# Patient Record
Sex: MALE | Race: WHITE | NOT HISPANIC OR LATINO | Employment: FULL TIME | URBAN - METROPOLITAN AREA
[De-identification: names, ages, dates, MRNs, and addresses within clinical notes are randomized per-mention and may not be internally consistent; named-entity substitution may affect disease eponyms.]

---

## 2023-09-05 ENCOUNTER — HOSPITAL ENCOUNTER (OUTPATIENT)
Dept: DATA CONVERSION | Facility: HOSPITAL | Age: 28
Discharge: HOME | End: 2023-09-05
Payer: COMMERCIAL

## 2023-09-05 DIAGNOSIS — E06.3 AUTOIMMUNE THYROIDITIS: ICD-10-CM

## 2023-09-05 LAB
T4 FREE SERPL-MCNC: 1.1 NG/DL (ref 0.9–1.7)
TSH SERPL DL<=0.05 MIU/L-ACNC: 2.45 MIU/L (ref 0.27–4.2)

## 2024-02-09 PROBLEM — E03.9 HYPOTHYROIDISM: Status: ACTIVE | Noted: 2024-02-09

## 2024-02-09 PROBLEM — J01.90 ACUTE INFECTION OF NASAL SINUS: Status: RESOLVED | Noted: 2024-02-09 | Resolved: 2024-02-09

## 2024-02-09 PROBLEM — J30.2 SEASONAL ALLERGIC RHINITIS: Status: ACTIVE | Noted: 2024-02-09

## 2024-02-09 PROBLEM — K58.9 IRRITABLE BOWEL SYNDROME: Status: ACTIVE | Noted: 2024-02-09

## 2024-02-09 PROBLEM — K30 FUNCTIONAL DYSPEPSIA: Status: ACTIVE | Noted: 2020-03-05

## 2024-02-09 PROBLEM — H69.90 EUSTACHIAN TUBE DYSFUNCTION: Status: ACTIVE | Noted: 2024-02-09

## 2024-02-09 PROBLEM — J30.2 SEASONAL ALLERGIES: Status: ACTIVE | Noted: 2024-02-09

## 2024-02-09 RX ORDER — BENZOCAINE .13; .15; .5; 2 G/100G; G/100G; G/100G; G/100G
GEL ORAL
COMMUNITY
Start: 2015-10-20 | End: 2024-02-13 | Stop reason: WASHOUT

## 2024-02-09 RX ORDER — METOPROLOL SUCCINATE 25 MG/1
25 TABLET, EXTENDED RELEASE ORAL DAILY
COMMUNITY
End: 2024-02-14 | Stop reason: SDUPTHER

## 2024-02-09 RX ORDER — AMITRIPTYLINE HYDROCHLORIDE 10 MG/1
TABLET, FILM COATED ORAL EVERY 24 HOURS
COMMUNITY
Start: 2022-12-13 | End: 2024-02-14 | Stop reason: SDUPTHER

## 2024-02-09 RX ORDER — IBUPROFEN 200 MG
TABLET ORAL EVERY 8 HOURS
COMMUNITY
End: 2024-02-14 | Stop reason: ALTCHOICE

## 2024-02-09 RX ORDER — CETIRIZINE HYDROCHLORIDE 10 MG/1
TABLET ORAL
COMMUNITY
End: 2024-02-13 | Stop reason: WASHOUT

## 2024-02-12 ASSESSMENT — PROMIS GLOBAL HEALTH SCALE
CARRYOUT_PHYSICAL_ACTIVITIES: COMPLETELY
RATE_AVERAGE_FATIGUE: MILD
RATE_GENERAL_HEALTH: VERY GOOD
EMOTIONAL_PROBLEMS: RARELY
CARRYOUT_SOCIAL_ACTIVITIES: EXCELLENT
RATE_QUALITY_OF_LIFE: EXCELLENT
RATE_MENTAL_HEALTH: VERY GOOD
RATE_PHYSICAL_HEALTH: VERY GOOD
RATE_AVERAGE_PAIN: 0
RATE_SOCIAL_SATISFACTION: GOOD

## 2024-02-14 ENCOUNTER — OFFICE VISIT (OUTPATIENT)
Dept: PRIMARY CARE | Facility: CLINIC | Age: 29
End: 2024-02-14
Payer: COMMERCIAL

## 2024-02-14 ENCOUNTER — LAB (OUTPATIENT)
Dept: LAB | Facility: LAB | Age: 29
End: 2024-02-14
Payer: COMMERCIAL

## 2024-02-14 VITALS
TEMPERATURE: 97.7 F | BODY MASS INDEX: 25.77 KG/M2 | OXYGEN SATURATION: 99 % | SYSTOLIC BLOOD PRESSURE: 120 MMHG | WEIGHT: 172 LBS | DIASTOLIC BLOOD PRESSURE: 70 MMHG | HEART RATE: 74 BPM

## 2024-02-14 DIAGNOSIS — K30 FUNCTIONAL DYSPEPSIA: ICD-10-CM

## 2024-02-14 DIAGNOSIS — R73.9 HYPERGLYCEMIA: ICD-10-CM

## 2024-02-14 DIAGNOSIS — Z00.00 ANNUAL PHYSICAL EXAM: Primary | ICD-10-CM

## 2024-02-14 DIAGNOSIS — I49.3 PVC (PREMATURE VENTRICULAR CONTRACTION): ICD-10-CM

## 2024-02-14 DIAGNOSIS — E55.9 VITAMIN D DEFICIENCY: ICD-10-CM

## 2024-02-14 DIAGNOSIS — Z23 ENCOUNTER FOR IMMUNIZATION: ICD-10-CM

## 2024-02-14 DIAGNOSIS — E78.2 MIXED HYPERLIPIDEMIA: ICD-10-CM

## 2024-02-14 DIAGNOSIS — Z01.89 ENCOUNTER FOR ROUTINE LABORATORY TESTING: ICD-10-CM

## 2024-02-14 DIAGNOSIS — K58.9 IRRITABLE BOWEL SYNDROME, UNSPECIFIED TYPE: ICD-10-CM

## 2024-02-14 DIAGNOSIS — J31.0 CHRONIC RHINITIS: ICD-10-CM

## 2024-02-14 DIAGNOSIS — E03.8 SUBCLINICAL HYPOTHYROIDISM: ICD-10-CM

## 2024-02-14 PROBLEM — E03.9 HYPOTHYROIDISM: Status: RESOLVED | Noted: 2024-02-09 | Resolved: 2024-02-14

## 2024-02-14 PROBLEM — H69.90 EUSTACHIAN TUBE DYSFUNCTION: Status: RESOLVED | Noted: 2024-02-09 | Resolved: 2024-02-14

## 2024-02-14 LAB
25(OH)D3 SERPL-MCNC: 29 NG/ML (ref 31–100)
ALBUMIN SERPL-MCNC: 4.9 G/DL (ref 3.5–5)
ALP BLD-CCNC: 105 U/L (ref 35–125)
ALT SERPL-CCNC: 39 U/L (ref 5–40)
ANION GAP SERPL CALC-SCNC: 10 MMOL/L
AST SERPL-CCNC: 20 U/L (ref 5–40)
BASOPHILS # BLD AUTO: 0.07 X10*3/UL (ref 0–0.1)
BASOPHILS NFR BLD AUTO: 1.1 %
BILIRUB DIRECT SERPL-MCNC: <0.2 MG/DL (ref 0–0.2)
BILIRUB SERPL-MCNC: 0.6 MG/DL (ref 0.1–1.2)
BUN SERPL-MCNC: 21 MG/DL (ref 8–25)
CALCIUM SERPL-MCNC: 9.8 MG/DL (ref 8.5–10.4)
CHLORIDE SERPL-SCNC: 101 MMOL/L (ref 97–107)
CHOLEST SERPL-MCNC: 227 MG/DL (ref 133–200)
CHOLEST/HDLC SERPL: 4.5 {RATIO}
CO2 SERPL-SCNC: 28 MMOL/L (ref 24–31)
CREAT SERPL-MCNC: 1.2 MG/DL (ref 0.4–1.6)
EGFRCR SERPLBLD CKD-EPI 2021: 83 ML/MIN/1.73M*2
EOSINOPHIL # BLD AUTO: 0.31 X10*3/UL (ref 0–0.7)
EOSINOPHIL NFR BLD AUTO: 5.1 %
ERYTHROCYTE [DISTWIDTH] IN BLOOD BY AUTOMATED COUNT: 12.8 % (ref 11.5–14.5)
EST. AVERAGE GLUCOSE BLD GHB EST-MCNC: 105 MG/DL
GLUCOSE SERPL-MCNC: 95 MG/DL (ref 65–99)
HBA1C MFR BLD: 5.3 %
HCT VFR BLD AUTO: 48.3 % (ref 41–52)
HDLC SERPL-MCNC: 51 MG/DL
HGB BLD-MCNC: 15.8 G/DL (ref 13.5–17.5)
IMM GRANULOCYTES # BLD AUTO: 0.01 X10*3/UL (ref 0–0.7)
IMM GRANULOCYTES NFR BLD AUTO: 0.2 % (ref 0–0.9)
LDLC SERPL CALC-MCNC: 135 MG/DL (ref 65–130)
LYMPHOCYTES # BLD AUTO: 1.8 X10*3/UL (ref 1.2–4.8)
LYMPHOCYTES NFR BLD AUTO: 29.5 %
MAGNESIUM SERPL-MCNC: 2.4 MG/DL (ref 1.6–3.1)
MCH RBC QN AUTO: 30.4 PG (ref 26–34)
MCHC RBC AUTO-ENTMCNC: 32.7 G/DL (ref 32–36)
MCV RBC AUTO: 93 FL (ref 80–100)
MONOCYTES # BLD AUTO: 0.56 X10*3/UL (ref 0.1–1)
MONOCYTES NFR BLD AUTO: 9.2 %
NEUTROPHILS # BLD AUTO: 3.35 X10*3/UL (ref 1.2–7.7)
NEUTROPHILS NFR BLD AUTO: 54.9 %
NRBC BLD-RTO: 0 /100 WBCS (ref 0–0)
PHOSPHATE SERPL-MCNC: 3.8 MG/DL (ref 2.5–4.5)
PLATELET # BLD AUTO: 229 X10*3/UL (ref 150–450)
POTASSIUM SERPL-SCNC: 4.9 MMOL/L (ref 3.4–5.1)
PROT SERPL-MCNC: 7.5 G/DL (ref 5.9–7.9)
RBC # BLD AUTO: 5.19 X10*6/UL (ref 4.5–5.9)
SODIUM SERPL-SCNC: 139 MMOL/L (ref 133–145)
TRIGL SERPL-MCNC: 206 MG/DL (ref 40–150)
TSH SERPL DL<=0.05 MIU/L-ACNC: 2.89 MIU/L (ref 0.27–4.2)
WBC # BLD AUTO: 6.1 X10*3/UL (ref 4.4–11.3)

## 2024-02-14 PROCEDURE — 85025 COMPLETE CBC W/AUTO DIFF WBC: CPT

## 2024-02-14 PROCEDURE — 82306 VITAMIN D 25 HYDROXY: CPT

## 2024-02-14 PROCEDURE — 84443 ASSAY THYROID STIM HORMONE: CPT

## 2024-02-14 PROCEDURE — 82248 BILIRUBIN DIRECT: CPT

## 2024-02-14 PROCEDURE — 80061 LIPID PANEL: CPT

## 2024-02-14 PROCEDURE — 83735 ASSAY OF MAGNESIUM: CPT

## 2024-02-14 PROCEDURE — 80053 COMPREHEN METABOLIC PANEL: CPT

## 2024-02-14 PROCEDURE — 84100 ASSAY OF PHOSPHORUS: CPT

## 2024-02-14 PROCEDURE — 36415 COLL VENOUS BLD VENIPUNCTURE: CPT

## 2024-02-14 PROCEDURE — 99395 PREV VISIT EST AGE 18-39: CPT | Performed by: STUDENT IN AN ORGANIZED HEALTH CARE EDUCATION/TRAINING PROGRAM

## 2024-02-14 PROCEDURE — 83036 HEMOGLOBIN GLYCOSYLATED A1C: CPT

## 2024-02-14 RX ORDER — OXYMETAZOLINE HYDROCHLORIDE 0.05 G/100ML
2 SPRAY, METERED NASAL 2 TIMES DAILY PRN
Start: 2024-02-14

## 2024-02-14 RX ORDER — FLUTICASONE PROPIONATE 50 MCG
2 SPRAY, SUSPENSION (ML) NASAL DAILY PRN
Start: 2024-02-14 | End: 2024-02-14 | Stop reason: SDUPTHER

## 2024-02-14 RX ORDER — AMITRIPTYLINE HYDROCHLORIDE 10 MG/1
10 TABLET, FILM COATED ORAL NIGHTLY
Start: 2024-02-14 | End: 2024-03-13 | Stop reason: SDUPTHER

## 2024-02-14 RX ORDER — METOPROLOL SUCCINATE 25 MG/1
25 TABLET, EXTENDED RELEASE ORAL DAILY
Start: 2024-02-14

## 2024-02-14 RX ORDER — FLUTICASONE PROPIONATE 50 MCG
2 SPRAY, SUSPENSION (ML) NASAL 2 TIMES DAILY PRN
Start: 2024-02-14

## 2024-02-14 RX ORDER — SODIUM CHLORIDE 0.65 %
1 AEROSOL, SPRAY (ML) NASAL 2 TIMES DAILY PRN
Start: 2024-02-14

## 2024-02-14 RX ORDER — MULTIVIT-MIN/FOLIC/VIT K/LYCOP 400-300MCG
1 TABLET ORAL DAILY
Start: 2024-02-14

## 2024-02-14 ASSESSMENT — ENCOUNTER SYMPTOMS
ALLERGIC/IMMUNOLOGIC NEGATIVE: 1
GASTROINTESTINAL NEGATIVE: 1
HEMATOLOGIC/LYMPHATIC NEGATIVE: 1
EYES NEGATIVE: 1
LOSS OF SENSATION IN FEET: 0
PSYCHIATRIC NEGATIVE: 1
MUSCULOSKELETAL NEGATIVE: 1
OCCASIONAL FEELINGS OF UNSTEADINESS: 0
ENDOCRINE NEGATIVE: 1
CONSTITUTIONAL NEGATIVE: 1
NEUROLOGICAL NEGATIVE: 1
DEPRESSION: 0
RESPIRATORY NEGATIVE: 1
CARDIOVASCULAR NEGATIVE: 1

## 2024-02-14 ASSESSMENT — PATIENT HEALTH QUESTIONNAIRE - PHQ9
SUM OF ALL RESPONSES TO PHQ9 QUESTIONS 1 AND 2: 0
2. FEELING DOWN, DEPRESSED OR HOPELESS: NOT AT ALL
1. LITTLE INTEREST OR PLEASURE IN DOING THINGS: NOT AT ALL

## 2024-02-14 ASSESSMENT — PAIN SCALES - GENERAL: PAINLEVEL: 0-NO PAIN

## 2024-02-14 NOTE — PROGRESS NOTES
[facility]: [facility]  PHYSICAL EXAM      Mor Pham is a 28 y.o. male that is presenting today for Annual Exam.    Assessment/Plan   Diagnoses and all orders for this visit:  Annual physical exam  -     Follow Up In Primary Care; Future  Encounter for routine laboratory testing  -     CBC and Auto Differential; Future  -     Basic Metabolic Panel; Future  -     Hepatic Function Panel; Future  -     Lipid Panel; Future  -     Hemoglobin A1C; Future  -     Vitamin D 25-Hydroxy,Total (for eval of Vitamin D levels); Future  -     TSH with reflex to Free T4 if abnormal; Future  -     Magnesium; Future  -     Phosphorus; Future  -     Basic Metabolic Panel; Future  -     Vitamin D 25-Hydroxy,Total (for eval of Vitamin D levels); Future  -     TSH with reflex to Free T4 if abnormal; Future  -     Magnesium; Future  -     Phosphorus; Future  Mixed hyperlipidemia  -     Lipid Panel; Future  Vitamin D deficiency  -     Vitamin D 25-Hydroxy,Total (for eval of Vitamin D levels); Future  -     Vitamin D 25-Hydroxy,Total (for eval of Vitamin D levels); Future  Hyperglycemia  -     Hemoglobin A1C; Future  Encounter for immunization  Irritable bowel syndrome, unspecified type  -     amitriptyline (Elavil) 10 mg tablet; Take 1 tablet (10 mg) by mouth once daily at bedtime.  PVC (premature ventricular contraction)  -     metoprolol succinate XL (Toprol-XL) 25 mg 24 hr tablet; Take 1 tablet (25 mg) by mouth once daily.  -     multivit-min-folic-vit K-lycop (One-A-Day Men's Multivitamin) 400- mcg tablet; Take 1 tablet by mouth once daily.  -     Basic Metabolic Panel; Future  -     Magnesium; Future  -     Phosphorus; Future  -     Magnesium; Future  -     Phosphorus; Future  Subclinical hypothyroidism  -     TSH with reflex to Free T4 if abnormal; Future  -     TSH with reflex to Free T4 if abnormal; Future  Functional dyspepsia  -     amitriptyline (Elavil) 10 mg tablet; Take 1 tablet (10 mg) by  mouth once daily at bedtime.  Chronic rhinitis  -     sodium chloride (Ocean Nasal) 0.65 % nasal spray; Administer 1 spray into each nostril 2 times a day as needed for congestion.  -     oxymetazoline (Afrin, oxymetazoline,) 0.05 % nasal spray; Administer 2 sprays into each nostril 2 times a day as needed for congestion. Do not use for more than 3 days.  -     fluticasone (Flonase) 50 mcg/actuation nasal spray; Administer 2 sprays into each nostril 2 times a day as needed for rhinitis or allergies. Shake gently. Before first use, prime pump. After use, clean tip and replace cap.    - Significant medication and problem list reconciliation done today.  - Patient notes that, since he has started exercising over the summer, he has been dealing with intermittent red rashes in his armpit. Suspect that this is localized fungal infection (similar to athlete's foot). Counseled OTC fungal medications as needed.  - Patient noting that his seasonal allergies / chronic rhinitis is still bothering him this time of the year. Counseled OTC sprays since his major concern is maximally / frontal sinus congestion. Counseled the patient that he cannot use the afrin more than three days in a row without taking a day off. Patient voiced understanding.  - Patient due for labwork. Ordered today. Will also order labwork for the patient's next appointment.  - Patient does not appear to be due for routine immunizations at this time.    Subjective   - The patient otherwise feels well and denies any acute symptoms or concerns at this time.  - The patient denies any changes or progression of their chronic medical problems.  - The patient denies any problems or concerns with their medications.      Review of Systems   Constitutional: Negative.    HENT: Negative.     Eyes: Negative.    Respiratory: Negative.     Cardiovascular: Negative.    Gastrointestinal: Negative.    Endocrine: Negative.    Genitourinary: Negative.    Musculoskeletal:  Negative.    Skin: Negative.    Allergic/Immunologic: Negative.    Neurological: Negative.    Hematological: Negative.    Psychiatric/Behavioral: Negative.     All other systems reviewed and are negative.     Objective   Vitals:    02/14/24 0806   BP: 120/70   Pulse: 74   Temp: 36.5 °C (97.7 °F)   SpO2: 99%     Body mass index is 25.77 kg/m².  Physical Exam  Vitals and nursing note reviewed.   Constitutional:       General: He is not in acute distress.     Appearance: Normal appearance. He is not ill-appearing.   HENT:      Head: Normocephalic and atraumatic.      Right Ear: Tympanic membrane, ear canal and external ear normal. There is no impacted cerumen.      Left Ear: Tympanic membrane, ear canal and external ear normal. There is no impacted cerumen.      Nose: Nose normal.      Mouth/Throat:      Mouth: Mucous membranes are moist.      Pharynx: Oropharynx is clear. No oropharyngeal exudate or posterior oropharyngeal erythema.   Eyes:      General: No scleral icterus.        Right eye: No discharge.         Left eye: No discharge.      Extraocular Movements: Extraocular movements intact.      Conjunctiva/sclera: Conjunctivae normal.      Pupils: Pupils are equal, round, and reactive to light.   Neck:      Vascular: No carotid bruit.   Cardiovascular:      Rate and Rhythm: Normal rate and regular rhythm.      Pulses: Normal pulses.      Heart sounds: Normal heart sounds. No murmur heard.     No friction rub. No gallop.   Pulmonary:      Effort: Pulmonary effort is normal. No respiratory distress.      Breath sounds: Normal breath sounds.   Abdominal:      General: Abdomen is flat. Bowel sounds are normal.      Palpations: Abdomen is soft.      Tenderness: There is no abdominal tenderness.      Hernia: No hernia is present.   Musculoskeletal:         General: No swelling. Normal range of motion.      Cervical back: Normal range of motion.   Lymphadenopathy:      Cervical: No cervical adenopathy.   Skin:      "General: Skin is warm and dry.      Coloration: Skin is not jaundiced.      Findings: No rash.   Neurological:      General: No focal deficit present.      Mental Status: He is alert and oriented to person, place, and time. Mental status is at baseline.   Psychiatric:         Mood and Affect: Mood normal.         Behavior: Behavior normal.       Diagnostic Results   Lab Results   Component Value Date    GLUCOSE 101 (H) 12/27/2022    CALCIUM 9.5 12/27/2022     12/27/2022    K 4.7 12/27/2022    CO2 29 12/27/2022     12/27/2022    BUN 17 12/27/2022    CREATININE 1.0 12/27/2022     Lab Results   Component Value Date    ALT 25 03/30/2023    AST 20 03/30/2023    ALKPHOS 78 03/30/2023    BILITOT 0.7 03/30/2023     Lab Results   Component Value Date    WBC 5.5 12/27/2022    HGB 16.3 12/27/2022    HCT 48.1 12/27/2022    MCV 93.6 12/27/2022     12/27/2022     No results found for: \"CHOL\"  No results found for: \"HDL\"  No results found for: \"LDLCALC\"  No results found for: \"TRIG\"  No components found for: \"CHOLHDL\"  Lab Results   Component Value Date    HGBA1C 5.0 12/27/2022     Other labs not included in the list above were reviewed either before or during this encounter.    History   Past Medical History:   Diagnosis Date    Acute infection of nasal sinus 02/09/2024    Allergic     Heart murmur      Past Surgical History:   Procedure Laterality Date    CIRCUMCISION, PRIMARY      HERNIA REPAIR  07/31/2013    Hernia Repair     No family history on file.  Social History     Socioeconomic History    Marital status: Single     Spouse name: Not on file    Number of children: Not on file    Years of education: Not on file    Highest education level: Not on file   Occupational History    Not on file   Tobacco Use    Smoking status: Never    Smokeless tobacco: Never   Vaping Use    Vaping Use: Never used   Substance and Sexual Activity    Alcohol use: Yes     Alcohol/week: 1.0 standard drink of alcohol     Types: 1 " Standard drinks or equivalent per week    Drug use: Yes     Types: Marijuana     Comment: Very occasionally (once every couple months)    Sexual activity: Not Currently     Partners: Female   Other Topics Concern    Not on file   Social History Narrative    Not on file     Social Determinants of Health     Financial Resource Strain: Not on file   Food Insecurity: Not on file   Transportation Needs: Not on file   Physical Activity: Not on file   Stress: Not on file   Social Connections: Not on file   Intimate Partner Violence: Not on file   Housing Stability: Not on file     No Known Allergies  Current Outpatient Medications on File Prior to Visit   Medication Sig Dispense Refill    [DISCONTINUED] amitriptyline (Elavil) 10 mg tablet once every 24 hours.      [DISCONTINUED] fluticasone propionate (FLONASE NASL) Flonase      [DISCONTINUED] ibuprofen 200 mg tablet every 8 hours.      [DISCONTINUED] metoprolol succinate XL (Toprol-XL) 25 mg 24 hr tablet Take 1 tablet (25 mg) by mouth once daily.      [DISCONTINUED] multivit-min/folic/vit K/lycop (ONE-A-DAY MEN'S MULTIVITAMIN ORAL) once every 24 hours.      [DISCONTINUED] budesonide (Rhinocort AQ) 32 mcg/actuation nasal spray Administer into affected nostril(s).      [DISCONTINUED] cetirizine (ZyrTEC) 10 mg tablet Take by mouth.       No current facility-administered medications on file prior to visit.     Immunization History   Administered Date(s) Administered    DTP 1993, 1993, 1993, 10/07/1994, 06/28/1998    DTP / HiB 1993, 1993, 1993    Flu vaccine (IIV4), preservative free *Check age/dose* 11/07/2021    Flu vaccine, quadrivalent, no egg protein, age 6 month or greater (FLUCELVAX) 10/04/2022, 09/25/2023    Hep A, Unspecified 08/25/2010, 08/23/2011    Hepatitis A vaccine, pediatric/adolescent (HAVRIX, VAQTA) 08/25/2010, 08/23/2011    Hepatitis B vaccine, adult (RECOMBIVAX, ENGERIX) 1993, 1993, 12/10/2003    Hepatitis B  vaccine, pediatric/adolescent (RECOMBIVAX, ENGERIX) 1993, 1993, 1993    HiB PRP-OMP conjugate vaccine, pediatric (PEDVAXHIB) 06/07/1994    HiB, unspecified 1993, 1993, 1993, 06/07/1994    MMR vaccine, subcutaneous (MMR II) 06/07/1994, 06/28/1998    Meningococcal MCV4P 09/28/2007, 07/31/2013    Pfizer Purple Cap SARS-CoV-2 04/09/2021, 12/08/2021    Polio, Unspecified 1993, 1993, 10/07/1994, 06/28/1998    Poliovirus vaccine, subcutaneous (IPOL) 1993, 1993, 10/07/1994, 06/28/1998    Td (adult), unspecified 08/10/2004    Td vaccine, age 7 years and older (TDVAX) 08/10/2004    Tdap vaccine, age 7 year and older (BOOSTRIX, ADACEL) 08/05/2009, 12/27/2022     Patient's medical history was reviewed and updated either before or during this encounter.       Pavel Contreras MD

## 2024-02-14 NOTE — PATIENT INSTRUCTIONS
Diagnoses and all orders for this visit:  Annual physical exam  -     Follow Up In Primary Care; Future  Encounter for routine laboratory testing  -     CBC and Auto Differential; Future  -     Basic Metabolic Panel; Future  -     Hepatic Function Panel; Future  -     Lipid Panel; Future  -     Hemoglobin A1C; Future  -     Vitamin D 25-Hydroxy,Total (for eval of Vitamin D levels); Future  -     TSH with reflex to Free T4 if abnormal; Future  -     Magnesium; Future  -     Phosphorus; Future  -     Basic Metabolic Panel; Future  -     Vitamin D 25-Hydroxy,Total (for eval of Vitamin D levels); Future  -     TSH with reflex to Free T4 if abnormal; Future  -     Magnesium; Future  -     Phosphorus; Future  Mixed hyperlipidemia  -     Lipid Panel; Future  Vitamin D deficiency  -     Vitamin D 25-Hydroxy,Total (for eval of Vitamin D levels); Future  -     Vitamin D 25-Hydroxy,Total (for eval of Vitamin D levels); Future  Hyperglycemia  -     Hemoglobin A1C; Future  Encounter for immunization  Irritable bowel syndrome, unspecified type  -     amitriptyline (Elavil) 10 mg tablet; Take 1 tablet (10 mg) by mouth once daily at bedtime.  PVC (premature ventricular contraction)  -     metoprolol succinate XL (Toprol-XL) 25 mg 24 hr tablet; Take 1 tablet (25 mg) by mouth once daily.  -     multivit-min-folic-vit K-lycop (One-A-Day Men's Multivitamin) 400- mcg tablet; Take 1 tablet by mouth once daily.  -     Basic Metabolic Panel; Future  -     Magnesium; Future  -     Phosphorus; Future  -     Magnesium; Future  -     Phosphorus; Future  Subclinical hypothyroidism  -     TSH with reflex to Free T4 if abnormal; Future  -     TSH with reflex to Free T4 if abnormal; Future  Functional dyspepsia  -     amitriptyline (Elavil) 10 mg tablet; Take 1 tablet (10 mg) by mouth once daily at bedtime.  Chronic rhinitis  -     sodium chloride (Ocean Nasal) 0.65 % nasal spray; Administer 1 spray into each nostril 2 times a day as needed  for congestion.  -     oxymetazoline (Afrin, oxymetazoline,) 0.05 % nasal spray; Administer 2 sprays into each nostril 2 times a day as needed for congestion. Do not use for more than 3 days.  -     fluticasone (Flonase) 50 mcg/actuation nasal spray; Administer 2 sprays into each nostril 2 times a day as needed for rhinitis or allergies. Shake gently. Before first use, prime pump. After use, clean tip and replace cap.    - Significant medication and problem list reconciliation done today.  - Patient notes that, since he has started exercising over the summer, he has been dealing with intermittent red rashes in his armpit. Suspect that this is localized fungal infection (similar to athlete's foot). Counseled OTC fungal medications as needed.  - Patient noting that his seasonal allergies / chronic rhinitis is still bothering him this time of the year. Counseled OTC sprays since his major concern is maximally / frontal sinus congestion. Counseled the patient that he cannot use the afrin more than three days in a row without taking a day off. Patient voiced understanding.  - Patient due for labwork. Ordered today. Will also order labwork for the patient's next appointment.  - Patient does not appear to be due for routine immunizations at this time.

## 2024-03-12 ENCOUNTER — PATIENT MESSAGE (OUTPATIENT)
Dept: PRIMARY CARE | Facility: CLINIC | Age: 29
End: 2024-03-12
Payer: COMMERCIAL

## 2024-03-12 DIAGNOSIS — K30 FUNCTIONAL DYSPEPSIA: ICD-10-CM

## 2024-03-12 DIAGNOSIS — K58.9 IRRITABLE BOWEL SYNDROME, UNSPECIFIED TYPE: ICD-10-CM

## 2024-03-13 RX ORDER — AMITRIPTYLINE HYDROCHLORIDE 10 MG/1
10 TABLET, FILM COATED ORAL NIGHTLY
Qty: 30 TABLET | Refills: 3 | Status: SHIPPED | OUTPATIENT
Start: 2024-03-13

## 2024-11-13 ENCOUNTER — PATIENT MESSAGE (OUTPATIENT)
Dept: PRIMARY CARE | Facility: CLINIC | Age: 29
End: 2024-11-13
Payer: COMMERCIAL

## 2024-11-13 DIAGNOSIS — I49.3 PVC (PREMATURE VENTRICULAR CONTRACTION): ICD-10-CM

## 2024-11-13 RX ORDER — METOPROLOL SUCCINATE 25 MG/1
25 TABLET, EXTENDED RELEASE ORAL DAILY
Qty: 90 TABLET | Refills: 3 | Status: SHIPPED | OUTPATIENT
Start: 2024-11-13

## 2024-12-20 ENCOUNTER — LAB (OUTPATIENT)
Dept: LAB | Facility: LAB | Age: 29
End: 2024-12-20
Payer: COMMERCIAL

## 2024-12-20 DIAGNOSIS — L65.9 NONSCARRING HAIR LOSS, UNSPECIFIED: Primary | ICD-10-CM

## 2024-12-20 DIAGNOSIS — E34.9 ENDOCRINE DISORDER, UNSPECIFIED: ICD-10-CM

## 2024-12-20 DIAGNOSIS — L63.8 OTHER ALOPECIA AREATA: ICD-10-CM

## 2024-12-20 LAB
25(OH)D3 SERPL-MCNC: 31 NG/ML (ref 30–100)
ANION GAP SERPL CALCULATED.3IONS-SCNC: 10 MMOL/L (ref 10–20)
BASOPHILS # BLD AUTO: 0.08 X10*3/UL (ref 0–0.1)
BASOPHILS NFR BLD AUTO: 1.2 %
BUN SERPL-MCNC: 21 MG/DL (ref 6–23)
CALCIUM SERPL-MCNC: 9.9 MG/DL (ref 8.6–10.3)
CHLORIDE SERPL-SCNC: 101 MMOL/L (ref 98–107)
CO2 SERPL-SCNC: 30 MMOL/L (ref 21–32)
CREAT SERPL-MCNC: 1.18 MG/DL (ref 0.5–1.3)
EGFRCR SERPLBLD CKD-EPI 2021: 85 ML/MIN/1.73M*2
EOSINOPHIL # BLD AUTO: 0.28 X10*3/UL (ref 0–0.7)
EOSINOPHIL NFR BLD AUTO: 4.3 %
ERYTHROCYTE [DISTWIDTH] IN BLOOD BY AUTOMATED COUNT: 13.1 % (ref 11.5–14.5)
FERRITIN SERPL-MCNC: 133 NG/ML (ref 20–300)
FOLATE SERPL-MCNC: 16.4 NG/ML
GLUCOSE SERPL-MCNC: 85 MG/DL (ref 74–99)
HCT VFR BLD AUTO: 44.7 % (ref 41–52)
HGB BLD-MCNC: 15.1 G/DL (ref 13.5–17.5)
IMM GRANULOCYTES # BLD AUTO: 0.01 X10*3/UL (ref 0–0.7)
IMM GRANULOCYTES NFR BLD AUTO: 0.2 % (ref 0–0.9)
LYMPHOCYTES # BLD AUTO: 2.02 X10*3/UL (ref 1.2–4.8)
LYMPHOCYTES NFR BLD AUTO: 31.4 %
MCH RBC QN AUTO: 30.4 PG (ref 26–34)
MCHC RBC AUTO-ENTMCNC: 33.8 G/DL (ref 32–36)
MCV RBC AUTO: 90 FL (ref 80–100)
MONOCYTES # BLD AUTO: 0.64 X10*3/UL (ref 0.1–1)
MONOCYTES NFR BLD AUTO: 9.9 %
NEUTROPHILS # BLD AUTO: 3.41 X10*3/UL (ref 1.2–7.7)
NEUTROPHILS NFR BLD AUTO: 53 %
NRBC BLD-RTO: 0 /100 WBCS (ref 0–0)
PLATELET # BLD AUTO: 236 X10*3/UL (ref 150–450)
POTASSIUM SERPL-SCNC: 4.4 MMOL/L (ref 3.5–5.3)
RBC # BLD AUTO: 4.96 X10*6/UL (ref 4.5–5.9)
SODIUM SERPL-SCNC: 137 MMOL/L (ref 136–145)
TSH SERPL-ACNC: 2.51 MIU/L (ref 0.44–3.98)
VIT B12 SERPL-MCNC: 622 PG/ML (ref 211–911)
WBC # BLD AUTO: 6.4 X10*3/UL (ref 4.4–11.3)

## 2024-12-20 PROCEDURE — 82728 ASSAY OF FERRITIN: CPT

## 2024-12-20 PROCEDURE — 84443 ASSAY THYROID STIM HORMONE: CPT

## 2024-12-20 PROCEDURE — 80048 BASIC METABOLIC PNL TOTAL CA: CPT

## 2024-12-20 PROCEDURE — 86038 ANTINUCLEAR ANTIBODIES: CPT

## 2024-12-20 PROCEDURE — 82746 ASSAY OF FOLIC ACID SERUM: CPT

## 2024-12-20 PROCEDURE — 85025 COMPLETE CBC W/AUTO DIFF WBC: CPT

## 2024-12-20 PROCEDURE — 82607 VITAMIN B-12: CPT

## 2024-12-20 PROCEDURE — 82306 VITAMIN D 25 HYDROXY: CPT

## 2024-12-22 LAB — ZINC SERPL-MCNC: 77 UG/DL (ref 60–120)

## 2024-12-23 LAB — ANA SER QL HEP2 SUBST: NEGATIVE

## 2025-01-12 DIAGNOSIS — K30 FUNCTIONAL DYSPEPSIA: ICD-10-CM

## 2025-01-12 DIAGNOSIS — K58.9 IRRITABLE BOWEL SYNDROME, UNSPECIFIED TYPE: ICD-10-CM

## 2025-01-13 RX ORDER — AMITRIPTYLINE HYDROCHLORIDE 10 MG/1
10 TABLET, FILM COATED ORAL NIGHTLY
Qty: 30 TABLET | Refills: 0 | Status: SHIPPED | OUTPATIENT
Start: 2025-01-13

## 2025-02-16 DIAGNOSIS — K30 FUNCTIONAL DYSPEPSIA: ICD-10-CM

## 2025-02-16 DIAGNOSIS — K58.9 IRRITABLE BOWEL SYNDROME, UNSPECIFIED TYPE: ICD-10-CM

## 2025-02-17 ENCOUNTER — OFFICE VISIT (OUTPATIENT)
Dept: PRIMARY CARE | Facility: CLINIC | Age: 30
End: 2025-02-17
Payer: COMMERCIAL

## 2025-02-17 VITALS
HEART RATE: 74 BPM | OXYGEN SATURATION: 99 % | WEIGHT: 178 LBS | DIASTOLIC BLOOD PRESSURE: 70 MMHG | BODY MASS INDEX: 26.36 KG/M2 | SYSTOLIC BLOOD PRESSURE: 128 MMHG | HEIGHT: 69 IN

## 2025-02-17 DIAGNOSIS — K30 FUNCTIONAL DYSPEPSIA: ICD-10-CM

## 2025-02-17 DIAGNOSIS — E78.2 MIXED HYPERLIPIDEMIA: ICD-10-CM

## 2025-02-17 DIAGNOSIS — R73.9 HYPERGLYCEMIA: ICD-10-CM

## 2025-02-17 DIAGNOSIS — Z23 ENCOUNTER FOR IMMUNIZATION: ICD-10-CM

## 2025-02-17 DIAGNOSIS — E55.9 VITAMIN D DEFICIENCY: ICD-10-CM

## 2025-02-17 DIAGNOSIS — L64.9 MALE PATTERN BALDNESS: ICD-10-CM

## 2025-02-17 DIAGNOSIS — J31.0 CHRONIC RHINITIS: ICD-10-CM

## 2025-02-17 DIAGNOSIS — I49.3 PVC (PREMATURE VENTRICULAR CONTRACTION): ICD-10-CM

## 2025-02-17 DIAGNOSIS — R53.82 CHRONIC FATIGUE: ICD-10-CM

## 2025-02-17 DIAGNOSIS — K58.9 IRRITABLE BOWEL SYNDROME, UNSPECIFIED TYPE: ICD-10-CM

## 2025-02-17 DIAGNOSIS — E03.8 SUBCLINICAL HYPOTHYROIDISM: ICD-10-CM

## 2025-02-17 DIAGNOSIS — Z01.89 ENCOUNTER FOR ROUTINE LABORATORY TESTING: ICD-10-CM

## 2025-02-17 DIAGNOSIS — Z00.00 ANNUAL PHYSICAL EXAM: Primary | ICD-10-CM

## 2025-02-17 LAB
CHOLEST SERPL-MCNC: 216 MG/DL
CHOLEST/HDLC SERPL: 4.6 (CALC)
HDLC SERPL-MCNC: 47 MG/DL
LDLC SERPL CALC-MCNC: 130 MG/DL (CALC)
NONHDLC SERPL-MCNC: 169 MG/DL (CALC)
TRIGL SERPL-MCNC: 240 MG/DL

## 2025-02-17 PROCEDURE — 99395 PREV VISIT EST AGE 18-39: CPT | Performed by: STUDENT IN AN ORGANIZED HEALTH CARE EDUCATION/TRAINING PROGRAM

## 2025-02-17 PROCEDURE — 1036F TOBACCO NON-USER: CPT | Performed by: STUDENT IN AN ORGANIZED HEALTH CARE EDUCATION/TRAINING PROGRAM

## 2025-02-17 PROCEDURE — 3008F BODY MASS INDEX DOCD: CPT | Performed by: STUDENT IN AN ORGANIZED HEALTH CARE EDUCATION/TRAINING PROGRAM

## 2025-02-17 RX ORDER — MINOXIDIL 2.5 MG/1
2.5 TABLET ORAL DAILY
COMMUNITY
Start: 2025-02-08 | End: 2025-02-17 | Stop reason: DRUGHIGH

## 2025-02-17 RX ORDER — AMITRIPTYLINE HYDROCHLORIDE 10 MG/1
TABLET, FILM COATED ORAL
Qty: 30 TABLET | Refills: 0 | OUTPATIENT
Start: 2025-02-17

## 2025-02-17 RX ORDER — METOPROLOL SUCCINATE 25 MG/1
25 TABLET, EXTENDED RELEASE ORAL DAILY
Status: SHIPPED
Start: 2025-02-17

## 2025-02-17 RX ORDER — MINOXIDIL 2.5 MG/1
TABLET ORAL
Status: SHIPPED
Start: 2025-02-17

## 2025-02-17 RX ORDER — AMITRIPTYLINE HYDROCHLORIDE 10 MG/1
10 TABLET, FILM COATED ORAL NIGHTLY
Qty: 90 TABLET | Refills: 3 | Status: SHIPPED | OUTPATIENT
Start: 2025-02-17 | End: 2026-02-17

## 2025-02-17 ASSESSMENT — PATIENT HEALTH QUESTIONNAIRE - PHQ9
SUM OF ALL RESPONSES TO PHQ9 QUESTIONS 1 AND 2: 0
1. LITTLE INTEREST OR PLEASURE IN DOING THINGS: NOT AT ALL
2. FEELING DOWN, DEPRESSED OR HOPELESS: NOT AT ALL

## 2025-02-17 ASSESSMENT — ENCOUNTER SYMPTOMS
ALLERGIC/IMMUNOLOGIC NEGATIVE: 1
CARDIOVASCULAR NEGATIVE: 1
HEMATOLOGIC/LYMPHATIC NEGATIVE: 1
CONSTITUTIONAL NEGATIVE: 1
PSYCHIATRIC NEGATIVE: 1
NEUROLOGICAL NEGATIVE: 1
MUSCULOSKELETAL NEGATIVE: 1
ENDOCRINE NEGATIVE: 1
GASTROINTESTINAL NEGATIVE: 1
RESPIRATORY NEGATIVE: 1
EYES NEGATIVE: 1

## 2025-02-17 ASSESSMENT — PAIN SCALES - GENERAL: PAINLEVEL_OUTOF10: 0-NO PAIN

## 2025-02-17 NOTE — PROGRESS NOTES
[facility]: [facility]  PHYSICAL EXAM      Mor Pham is a 29 y.o. male that is presenting today for Annual Exam.    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 States that he had to stop working at that time and took approximately 20 minutes before he felt like he was back to his baseline. Patient states that, outside of this episode, he really hasn't had any palpitations in recent history. For now, will continue current dosing of the metoprolol. Patient needs a new cardiologist.  - Patient notes that he has been dealing with some R-index finger strain and occasionally gets some pain in the joint.  - Otherwise, the patient feels well and denies any acute symptoms / concerns at this time.  - Blood pressure at goal today.  - Encouraged continued dietary, exercise, and lifestyle modification.  - Significant medication and problem list reconciliation done today.     Discussed routine and/or preventative care with the patient as outlined below:  - Labwork:   - Patient had labwork done for this appointment. Discussed today.   - It looks like the patient had some labwork done prior to starting the minoxidil. That being said, the patient did not have his cholesterol done. Discussed whether or not we need to send him back to the lab this year or just do it next year.  - Will order labwork for the patient's next appointment. Encouraged the patient to get this labwork done one week prior to the next appointment.  - Imaging:   - Patient does not appear to be due for routine imaging at this time.  - Immunizations:   - Patient does not appear to be due for routine immunizations at this time.     Diagnoses and all orders for this visit:  Annual physical exam  -     Follow Up In Primary Care  Encounter for routine laboratory testing  Mixed hyperlipidemia  -     Lipid Panel; Future  -     Hepatic Function Panel;  Future  -     Lipid Panel; Future  Vitamin D deficiency  -     Vitamin D 25-Hydroxy,Total (for eval of Vitamin D levels); Future  Chronic fatigue  -     CBC and Auto Differential; Future  -     Basic Metabolic Panel; Future  -     Testosterone,Free and Total; Future  Hyperglycemia  -     Hemoglobin A1C; Future  Encounter for immunization  Irritable bowel syndrome, unspecified type  -     amitriptyline (Elavil) 10 mg tablet; Take 1 tablet (10 mg) by mouth once daily at bedtime.  PVC (premature ventricular contraction)  -     metoprolol succinate XL (Toprol-XL) 25 mg 24 hr tablet; Take 1 tablet (25 mg) by mouth once daily.  -     Referral to Cardiology; Future  Subclinical hypothyroidism  -     TSH with reflex to Free T4 if abnormal; Future  Male pattern baldness  -     Testosterone,Free and Total; Future  Functional dyspepsia  -     amitriptyline (Elavil) 10 mg tablet; Take 1 tablet (10 mg) by mouth once daily at bedtime.  Chronic rhinitis  Other orders  -     minoxidil (Loniten) 2.5 mg tablet; Take 0.25 tablet once/day.    Current Outpatient Medications   Medication Instructions    amitriptyline (ELAVIL) 10 mg, oral, Nightly    fluticasone (Flonase) 50 mcg/actuation nasal spray 2 sprays, Each Nostril, 2 times daily PRN, Shake gently. Before first use, prime pump. After use, clean tip and replace cap.    metoprolol succinate XL (TOPROL-XL) 25 mg, oral, Daily    minoxidil (Loniten) 2.5 mg tablet Take 0.25 tablet once/day.    multivit-min-folic-vit K-lycop (One-A-Day Men's Multivitamin) 400- mcg tablet 1 tablet, oral, Daily    oxymetazoline (Afrin, oxymetazoline,) 0.05 % nasal spray 2 sprays, Each Nostril, 2 times daily PRN, Do not use for more than 3 days.    sodium chloride (Ocean Nasal) 0.65 % nasal spray 1 spray, Each Nostril, 2 times daily PRN     Subjective   - The patient otherwise feels well and denies any acute symptoms or concerns at this time.  - The patient denies any changes or progression of their  chronic medical problems.  - The patient denies any problems or concerns with their medications.      Review of Systems   Constitutional: Negative.    HENT: Negative.     Eyes: Negative.    Respiratory: Negative.     Cardiovascular: Negative.    Gastrointestinal: Negative.    Endocrine: Negative.    Genitourinary: Negative.    Musculoskeletal: Negative.    Skin: Negative.    Allergic/Immunologic: Negative.    Neurological: Negative.    Hematological: Negative.    Psychiatric/Behavioral: Negative.     All other systems reviewed and are negative.     Objective   Vitals:    02/17/25 0806   BP: 128/70   Pulse: 74   SpO2: 99%     Body mass index is 26.67 kg/m².  Physical Exam  Vitals and nursing note reviewed.   Constitutional:       General: He is not in acute distress.     Appearance: Normal appearance. He is not ill-appearing.   HENT:      Head: Normocephalic and atraumatic.      Right Ear: Tympanic membrane, ear canal and external ear normal. There is no impacted cerumen.      Left Ear: Tympanic membrane, ear canal and external ear normal. There is no impacted cerumen.      Nose: Nose normal.      Mouth/Throat:      Mouth: Mucous membranes are moist.      Pharynx: Oropharynx is clear. No oropharyngeal exudate or posterior oropharyngeal erythema.   Eyes:      General: No scleral icterus.        Right eye: No discharge.         Left eye: No discharge.      Extraocular Movements: Extraocular movements intact.      Conjunctiva/sclera: Conjunctivae normal.      Pupils: Pupils are equal, round, and reactive to light.   Neck:      Vascular: No carotid bruit.   Cardiovascular:      Rate and Rhythm: Normal rate and regular rhythm.      Pulses: Normal pulses.      Heart sounds: Normal heart sounds. No murmur heard.     No friction rub. No gallop.   Pulmonary:      Effort: Pulmonary effort is normal. No respiratory distress.      Breath sounds: Normal breath sounds.   Abdominal:      General: Abdomen is flat. Bowel sounds are  "normal.      Palpations: Abdomen is soft.      Tenderness: There is no abdominal tenderness.      Hernia: No hernia is present.   Musculoskeletal:         General: No swelling. Normal range of motion.      Cervical back: Normal range of motion.   Lymphadenopathy:      Cervical: No cervical adenopathy.   Skin:     General: Skin is warm and dry.      Coloration: Skin is not jaundiced.      Findings: No rash.   Neurological:      General: No focal deficit present.      Mental Status: He is alert and oriented to person, place, and time. Mental status is at baseline.   Psychiatric:         Mood and Affect: Mood normal.         Behavior: Behavior normal.       Diagnostic Results   Lab Results   Component Value Date    GLUCOSE 85 12/20/2024    CALCIUM 9.9 12/20/2024     12/20/2024    K 4.4 12/20/2024    CO2 30 12/20/2024     12/20/2024    BUN 21 12/20/2024    CREATININE 1.18 12/20/2024     Lab Results   Component Value Date    ALT 39 02/14/2024    AST 20 02/14/2024    ALKPHOS 105 02/14/2024    BILITOT 0.6 02/14/2024     Lab Results   Component Value Date    WBC 6.4 12/20/2024    HGB 15.1 12/20/2024    HCT 44.7 12/20/2024    MCV 90 12/20/2024     12/20/2024     Lab Results   Component Value Date    CHOL 227 (H) 02/14/2024     Lab Results   Component Value Date    HDL 51.0 02/14/2024     Lab Results   Component Value Date    LDLCALC 135 (H) 02/14/2024     Lab Results   Component Value Date    TRIG 206 (H) 02/14/2024     No components found for: \"CHOLHDL\"  Lab Results   Component Value Date    HGBA1C 5.3 02/14/2024     Other labs not included in the list above were reviewed either before or during this encounter.    History   Past Medical History:   Diagnosis Date    Acute infection of nasal sinus 02/09/2024    Allergic     Heart murmur      Past Surgical History:   Procedure Laterality Date    CIRCUMCISION, PRIMARY      HERNIA REPAIR  07/31/2013    Hernia Repair     No family history on file.  Social " History     Socioeconomic History    Marital status: Single     Spouse name: Not on file    Number of children: Not on file    Years of education: Not on file    Highest education level: Not on file   Occupational History    Not on file   Tobacco Use    Smoking status: Never    Smokeless tobacco: Never   Vaping Use    Vaping status: Never Used   Substance and Sexual Activity    Alcohol use: Yes     Alcohol/week: 1.0 standard drink of alcohol     Types: 1 Standard drinks or equivalent per week    Drug use: Yes     Types: Marijuana     Comment: Very occasionally (once every couple months)    Sexual activity: Not Currently     Partners: Female   Other Topics Concern    Not on file   Social History Narrative    Not on file     Social Drivers of Health     Financial Resource Strain: Not on file   Food Insecurity: Not on file   Transportation Needs: Not on file   Physical Activity: Not on file   Stress: Not on file   Social Connections: Not on file   Intimate Partner Violence: Not on file   Housing Stability: Not on file     No Known Allergies  Current Outpatient Medications on File Prior to Visit   Medication Sig Dispense Refill    fluticasone (Flonase) 50 mcg/actuation nasal spray Administer 2 sprays into each nostril 2 times a day as needed for rhinitis or allergies. Shake gently. Before first use, prime pump. After use, clean tip and replace cap.      multivit-min-folic-vit K-lycop (One-A-Day Men's Multivitamin) 400- mcg tablet Take 1 tablet by mouth once daily.      oxymetazoline (Afrin, oxymetazoline,) 0.05 % nasal spray Administer 2 sprays into each nostril 2 times a day as needed for congestion. Do not use for more than 3 days.      sodium chloride (Ocean Nasal) 0.65 % nasal spray Administer 1 spray into each nostril 2 times a day as needed for congestion.      [DISCONTINUED] amitriptyline (Elavil) 10 mg tablet TAKE ONE TABLET BY MOUTH ONCE DAILY AT BEDTIME 30 tablet 0    [DISCONTINUED] metoprolol succinate  XL (Toprol-XL) 25 mg 24 hr tablet Take 1 tablet (25 mg) by mouth once daily. 90 tablet 3    [DISCONTINUED] minoxidil (Loniten) 2.5 mg tablet Take 1 tablet (2.5 mg) by mouth once daily. Take 1/4 tablet daily       No current facility-administered medications on file prior to visit.     Immunization History   Administered Date(s) Administered    COVID-19, mRNA, LNP-S, PF, 30 mcg/0.3 mL dose 04/09/2021, 12/08/2021    DTP 1993, 1993, 1993, 10/07/1994, 06/28/1998    DTP / HiB 1993, 1993, 1993    Flu vaccine (IIV4), preservative free *Check age/dose* 11/07/2021    Flu vaccine, quadrivalent, no egg protein, age 6 month or greater (FLUCELVAX) 10/04/2022, 09/25/2023    Flu vaccine, trivalent, preservative free, no egg protein, age 6 months or greater (Flucelvax) 09/17/2024    Hep A, Unspecified 08/25/2010, 08/23/2011    Hepatitis A vaccine, pediatric/adolescent (HAVRIX, VAQTA) 08/25/2010, 08/23/2011    Hepatitis B vaccine, 19 yrs and under (RECOMBIVAX, ENGERIX) 1993, 1993, 1993    Hepatitis B vaccine, adult *Check Product/Dose* 1993, 1993, 12/10/2003    HiB PRP-OMP conjugate vaccine, pediatric (PEDVAXHIB) 06/07/1994    HiB, unspecified 1993, 1993, 1993, 06/07/1994    MMR vaccine, subcutaneous (MMR II) 06/07/1994, 06/28/1998    Meningococcal ACWY-D (Menactra) 4-valent conjugate vaccine 09/28/2007, 07/31/2013    Polio, Unspecified 1993, 1993, 10/07/1994, 06/28/1998    Poliovirus vaccine, subcutaneous (IPOL) 1993, 1993, 10/07/1994, 06/28/1998    Td (adult), unspecified 08/10/2004    Td vaccine, age 7 years and older (TDVAX) 08/10/2004    Tdap vaccine, age 7 year and older (BOOSTRIX, ADACEL) 08/05/2009, 12/27/2022     Patient's medical history was reviewed and updated either before or during this encounter.       Pavel Contreras MD

## 2025-02-17 NOTE — PATIENT INSTRUCTIONS
- Patient noting that his HR was very elevated the other day, roughly 190s, while he was outside snow[employer]. States that he had to stop working at that time and took approximately 20 minutes before he felt like he was back to his baseline. Patient states that, outside of this episode, he really hasn't had any palpitations in recent history. For now, will continue current dosing of the metoprolol. Patient needs a new cardiologist.  - Patient notes that he has been dealing with some R-index finger strain and occasionally gets some pain in the joint.  - Otherwise, the patient feels well and denies any acute symptoms / concerns at this time.  - Blood pressure at goal today.  - Encouraged continued dietary, exercise, and lifestyle modification.  - Significant medication and problem list reconciliation done today.     Discussed routine and/or preventative care with the patient as outlined below:  - Labwork:   - Patient had labwork done for this appointment. Discussed today.   - It looks like the patient had some labwork done prior to starting the minoxidil. That being said, the patient did not have his cholesterol done. Discussed whether or not we need to send him back to the lab this year or just do it next year.  - Will order labwork for the patient's next appointment. Encouraged the patient to get this labwork done one week prior to the next appointment.  - Imaging:   - Patient does not appear to be due for routine imaging at this time.  - Immunizations:   - Patient does not appear to be due for routine immunizations at this time.

## 2025-03-03 ENCOUNTER — OFFICE VISIT (OUTPATIENT)
Dept: CARDIOLOGY | Facility: CLINIC | Age: 30
End: 2025-03-03
Payer: COMMERCIAL

## 2025-03-03 VITALS
HEIGHT: 68 IN | WEIGHT: 170.8 LBS | OXYGEN SATURATION: 97 % | SYSTOLIC BLOOD PRESSURE: 114 MMHG | BODY MASS INDEX: 25.88 KG/M2 | DIASTOLIC BLOOD PRESSURE: 77 MMHG | HEART RATE: 79 BPM

## 2025-03-03 DIAGNOSIS — E78.2 MIXED HYPERLIPIDEMIA: Primary | ICD-10-CM

## 2025-03-03 DIAGNOSIS — I49.1 PAC (PREMATURE ATRIAL CONTRACTION): ICD-10-CM

## 2025-03-03 DIAGNOSIS — I47.19 ATRIAL TACHYCARDIA (CMS-HCC): ICD-10-CM

## 2025-03-03 DIAGNOSIS — I49.3 PVC (PREMATURE VENTRICULAR CONTRACTION): ICD-10-CM

## 2025-03-03 DIAGNOSIS — L65.9 ALOPECIA: ICD-10-CM

## 2025-03-03 LAB
ATRIAL RATE: 75 BPM
P AXIS: 37 DEGREES
P OFFSET: 201 MS
P ONSET: 153 MS
PR INTERVAL: 140 MS
Q ONSET: 223 MS
QRS COUNT: 12 BEATS
QRS DURATION: 82 MS
QT INTERVAL: 360 MS
QTC CALCULATION(BAZETT): 402 MS
QTC FREDERICIA: 387 MS
R AXIS: 36 DEGREES
T AXIS: 37 DEGREES
T OFFSET: 403 MS
VENTRICULAR RATE: 75 BPM

## 2025-03-03 PROCEDURE — 1036F TOBACCO NON-USER: CPT | Performed by: STUDENT IN AN ORGANIZED HEALTH CARE EDUCATION/TRAINING PROGRAM

## 2025-03-03 PROCEDURE — 3008F BODY MASS INDEX DOCD: CPT | Performed by: STUDENT IN AN ORGANIZED HEALTH CARE EDUCATION/TRAINING PROGRAM

## 2025-03-03 PROCEDURE — 99205 OFFICE O/P NEW HI 60 MIN: CPT | Performed by: STUDENT IN AN ORGANIZED HEALTH CARE EDUCATION/TRAINING PROGRAM

## 2025-03-03 PROCEDURE — 99215 OFFICE O/P EST HI 40 MIN: CPT | Performed by: STUDENT IN AN ORGANIZED HEALTH CARE EDUCATION/TRAINING PROGRAM

## 2025-03-03 PROCEDURE — 93005 ELECTROCARDIOGRAM TRACING: CPT | Performed by: STUDENT IN AN ORGANIZED HEALTH CARE EDUCATION/TRAINING PROGRAM

## 2025-03-03 ASSESSMENT — PAIN SCALES - GENERAL: PAINLEVEL_OUTOF10: 0-NO PAIN

## 2025-03-03 ASSESSMENT — COLUMBIA-SUICIDE SEVERITY RATING SCALE - C-SSRS
2. HAVE YOU ACTUALLY HAD ANY THOUGHTS OF KILLING YOURSELF?: NO
1. IN THE PAST MONTH, HAVE YOU WISHED YOU WERE DEAD OR WISHED YOU COULD GO TO SLEEP AND NOT WAKE UP?: NO
6. HAVE YOU EVER DONE ANYTHING, STARTED TO DO ANYTHING, OR PREPARED TO DO ANYTHING TO END YOUR LIFE?: NO

## 2025-03-03 ASSESSMENT — ENCOUNTER SYMPTOMS
LOSS OF SENSATION IN FEET: 0
OCCASIONAL FEELINGS OF UNSTEADINESS: 0
DEPRESSION: 0

## 2025-03-03 ASSESSMENT — PATIENT HEALTH QUESTIONNAIRE - PHQ9
2. FEELING DOWN, DEPRESSED OR HOPELESS: NOT AT ALL
SUM OF ALL RESPONSES TO PHQ9 QUESTIONS 1 AND 2: 0
1. LITTLE INTEREST OR PLEASURE IN DOING THINGS: NOT AT ALL

## 2025-03-03 NOTE — ASSESSMENT & PLAN NOTE
- Mild LDL and TG elevation  - No coronary or aorta calcium on chest CT from 2022 (non-cardiac)  - We discussed about decreasing cholesterol and mainly carbohydrates in diet

## 2025-03-03 NOTE — PROGRESS NOTES
"Location of visit: [facility] [address] [place] [address]   Type of Visit: New    Chief Complaint:  Patient was referred to Cardiology for No chief complaint on file..    History Of Present Illness:    Mor Pham \"Brent\" is a 29 y.o. male, with history significant for focal PACs, PVCs, atrial tachycardia on metoprolol, mixed HLD, and IBS, alopecia, who visits Cardiology today as a new patient  for tachycardia. Last labs from 12/2024 unremarkable except for lipid panel with  and . 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 He had some dizziness associated to it. No chest pain, no syncope.   He reports mild alcohol intake, patient denies chest pain, dyspnea on exertion, shortness of breath, orthopnea, PND, nocturia, edema, palpitations, dizziness, lightheadedness, syncope, claudication, or snoring/apnea.    Blood pressure: 114/77 mmHg  HR: 79 bpm    Today's ECG shows sinus rhythm at 79 bpm, normal AV conduction, isolated PACs, and normal ventricular repolarization.    Past Medical History:  He has a past medical history of Acute infection of nasal sinus (02/09/2024), Allergic, and Heart murmur.    Past Surgical History:  He has a past surgical history that includes Hernia repair (07/31/2013) and Circumcision, primary.    Social History:  He reports that he has never smoked. He has never used smokeless tobacco. He reports current alcohol use of about 1.0 standard drink of alcohol per week. He reports current drug use. Drug: Marijuana.    Family History:  HTN, HLD    Allergies:  Patient has no known allergies.    Outpatient Medications:  Current Outpatient Medications   Medication Instructions    amitriptyline (ELAVIL) 10 mg, oral, Nightly    fluticasone (Flonase) 50 mcg/actuation nasal spray 2 sprays, Each Nostril, 2 times daily PRN, Shake gently. Before first use, prime pump. After use, clean tip and replace cap.    metoprolol succinate XL (TOPROL-XL) 25 mg, oral, " "Daily    minoxidil (Loniten) 2.5 mg tablet Take 0.25 tablet once/day.    multivit-min-folic-vit K-lycop (One-A-Day Men's Multivitamin) 400- mcg tablet 1 tablet, oral, Daily    oxymetazoline (Afrin, oxymetazoline,) 0.05 % nasal spray 2 sprays, Each Nostril, 2 times daily PRN, Do not use for more than 3 days.    sodium chloride (Ocean Nasal) 0.65 % nasal spray 1 spray, Each Nostril, 2 times daily PRN     Last Recorded Vitals:  Vitals:    03/03/25 0758 03/03/25 0759   BP: 102/64 114/77   BP Location: Left arm Right arm   Patient Position: Sitting Sitting   BP Cuff Size: Large adult Large adult   Pulse: 79    SpO2: 97%    Weight: 77.5 kg (170 lb 12.8 oz)    Height: 1.727 m (5' 8\")      Physical Exam:      3/3/2025     7:59 AM 3/3/2025     7:58 AM 2/17/2025     8:06 AM 2/14/2024     8:06 AM 9/7/2023    12:00 AM 12/27/2022    12:00 AM   Vitals   Systolic 114 102 128 120 107 110   Diastolic 77 64 70 70 71 70   BP Location Right arm Left arm  Left arm     Heart Rate  79 74 74 82 89   Temp    36.5 °C (97.7 °F)  36.4 °C (97.6 °F)   Height  1.727 m (5' 8\") 1.74 m (5' 8.5\")  1.74 m (5' 8.5\") 1.74 m (5' 8.5\")   Weight (lb)  170.8 178 172 169.8 162   BMI  25.97 kg/m2 26.67 kg/m2 25.77 kg/m2 25.44 kg/m2 24.27 kg/m2   BSA (m2)  1.93 m2 1.97 m2 1.94 m2 1.93 m2 1.88 m2   Visit Report Report Report Report Report       Wt Readings from Last 5 Encounters:   03/03/25 77.5 kg (170 lb 12.8 oz)   02/17/25 80.7 kg (178 lb)   02/14/24 78 kg (172 lb)   09/07/23 77 kg (169 lb 12.8 oz)   12/27/22 73.5 kg (162 lb)     General: Sitting up comfortably in chair; in no apparent distress.  HEENT: Normocephalic; atraumatic. Well hydrated.  Eyes: Anicteric sclera. Extraocular movement intact.  Neck: Supple; no thyromegaly; normal jugular venous pressure, no bruits.  Respiratory: Bilateral air entry equal. No wheezing.  Cardiovascular: Normal S1, S2; no murmurs auscultated.  Abdomen: Nondistended; nontender. (+) bowel sounds.  Extremities: No " peripheral edema present. Pulses 2+ diffusely.  Neurological: Oriented to time, place, and person; nonfocal.  Psychiatric: Normal affect.     Last Labs Reviewed:  CBC -  Recent Labs     12/20/24  1059 02/14/24  0856 12/27/22  1052 01/14/22  0907 02/27/18  1047   WBC 6.4 6.1 5.5 5.3 4.4*   HGB 15.1 15.8 16.3 14.6 15.7   HCT 44.7 48.3 48.1 44.6 45.9    229 222 208 221   MCV 90 93 93.6 92.7 95.0     CMP -  Recent Labs     12/20/24  1059 02/14/24  0856 12/27/22  1052 01/14/22  0907 02/27/18  1047    139 140 140 142   K 4.4 4.9 4.7 4.9 4.8    101 101 102 101   CO2 30 28 29 28 29   ANIONGAP 10 10 10 10 12   BUN 21 21 17 23 21   CREATININE 1.18 1.20 1.0 1.1 1.0   EGFR 85 83 104 94 98   MG  --  2.40  --  2.1  --    CALCIUM 9.9 9.8 9.5 9.6 9.6     Recent Labs     02/14/24  0856 03/30/23  0816 12/27/22  1052 01/14/22  0907 03/16/18  1023   ALBUMIN 4.9 4.7 5.2* 5.0 5.0   ALKPHOS 105 78 88 81 73   ALT 39 25 54* 46* 26   AST 20 20 30 33 20   BILITOT 0.6 0.7 0.6 0.4 0.5     LIPID PANEL -   Recent Labs     02/17/25  0846 02/14/24  0856   CHOL 216* 227*   LDLCALC 130* 135*   HDL 47 51.0   TRIG 240* 206*     HEME/ENDO -  Recent Labs     12/20/24  1059 02/14/24  0856 09/05/23  0923 12/27/22  1052 08/29/22  0817   FERRITIN 133  --   --   --   --    TSH 2.51 2.89 2.45  --  2.21   HGBA1C  --  5.3  --  5.0  --    FREET4  --   --  1.1  --  1.1     Last Cardiology/Imaging Tests Personally Reviewed (if images available) and Interpreted:  ECG:  No results found.    Echocardiogram:  TRANSTHORACIC ECHOCARDIOGRAM; 1/26/2022   - Normal LVEF  - No LVH  - Mild MR    Cath:  No results found.    Stress Test:  No results found.    Cardiac CT/MRI:  No results found.    Other CT:  ANGIO CHEST: 2/21/2022  - No evidence of atherosclerosis or calcifications     CV RISK FACTORS:   # Hypertension: Last BP: 114/77.  # Hyperlipidemia: Last Tchol 216 / LDL No results found for requested labs within last 365 days. / HDL 47 / TRIG 240  (2/17/2025:  8:46 AM).  # Type II Diabetes Mellitus: Last A1c No results found for requested labs within last 365 days. (No results in last year.).  # Obesity: Last BMI: 25.98.  # CKD: Last BUN/Cr (GFR): 21/1.18 (85), 12/20/2024: 10:59 AM.    ASCV RISK:  The ASCVD Risk score (Sourav DK, et al., 2019) failed to calculate for the following reasons:    The 2019 ASCVD risk score is only valid for ages 40 to 79    Assessment:  29 y.o. male, with history significant for PVC, atrial tachycardia, mixed HLD, alopecia, and IBS, who visits Cardiology today as a new patient  for tachycardia.  Assessment & Plan  PVC (premature ventricular contraction)  - Not symptomatic and already on low dose metoprolol  Mixed hyperlipidemia  - Mild LDL and TG elevation  - No coronary or aorta calcium on chest CT from 2022 (non-cardiac)  - We discussed about decreasing cholesterol and mainly carbohydrates in diet  Atrial tachycardia (CMS-HCC)  - Demonstrated by prior Holter  - Probably triggered by PACs  - Isolated episodes on metoprolol, will continue  - We activated Afib detection on Apple Watch   PAC (premature atrial contraction)  - Appear focal, seen on today's EKG  - Reviewed electrolytes and were within normal range on last assessment 12/2024  - Will continue metoprolol  Alopecia  - Recommended Hair Volume supplement 1 tablet once a day    Patient will follow up with me in the Cardiology office in 12 months with new lipid panel or as needed  I spent 60 minutes assessing the case between pre-charting, face-to-face patient interaction, and documentation    Carlos Livingston MD

## 2025-03-03 NOTE — ASSESSMENT & PLAN NOTE
- Appear focal, seen on today's EKG  - Reviewed electrolytes and were within normal range on last assessment 12/2024  - Will continue metoprolol

## 2025-03-03 NOTE — ASSESSMENT & PLAN NOTE
- Demonstrated by prior Holter  - Probably triggered by PACs  - Isolated episodes on metoprolol, will continue  - We activated Afib detection on Apple Watch

## 2025-03-31 ENCOUNTER — APPOINTMENT (OUTPATIENT)
Dept: CARDIOLOGY | Facility: CLINIC | Age: 30
End: 2025-03-31
Payer: COMMERCIAL